# Patient Record
Sex: MALE | Race: WHITE | Employment: FULL TIME | URBAN - METROPOLITAN AREA
[De-identification: names, ages, dates, MRNs, and addresses within clinical notes are randomized per-mention and may not be internally consistent; named-entity substitution may affect disease eponyms.]

---

## 2024-09-06 ENCOUNTER — OFFICE VISIT (OUTPATIENT)
Dept: PRIMARY CARE CLINIC | Age: 29
End: 2024-09-06
Payer: COMMERCIAL

## 2024-09-06 VITALS
TEMPERATURE: 97.9 F | DIASTOLIC BLOOD PRESSURE: 70 MMHG | WEIGHT: 216.8 LBS | HEART RATE: 98 BPM | SYSTOLIC BLOOD PRESSURE: 110 MMHG | HEIGHT: 70 IN | OXYGEN SATURATION: 99 % | RESPIRATION RATE: 12 BRPM | BODY MASS INDEX: 31.04 KG/M2

## 2024-09-06 DIAGNOSIS — Z11.4 ENCOUNTER FOR SCREENING FOR HIV: ICD-10-CM

## 2024-09-06 DIAGNOSIS — Z00.00 HEALTHCARE MAINTENANCE: Primary | ICD-10-CM

## 2024-09-06 DIAGNOSIS — Z13.220 SCREENING CHOLESTEROL LEVEL: ICD-10-CM

## 2024-09-06 DIAGNOSIS — Z13.1 DIABETES MELLITUS SCREENING: ICD-10-CM

## 2024-09-06 DIAGNOSIS — Z23 IMMUNIZATION DUE: ICD-10-CM

## 2024-09-06 DIAGNOSIS — Z11.59 ENCOUNTER FOR HEPATITIS C SCREENING TEST FOR LOW RISK PATIENT: ICD-10-CM

## 2024-09-06 PROCEDURE — 90471 IMMUNIZATION ADMIN: CPT | Performed by: FAMILY MEDICINE

## 2024-09-06 PROCEDURE — 90651 9VHPV VACCINE 2/3 DOSE IM: CPT | Performed by: FAMILY MEDICINE

## 2024-09-06 PROCEDURE — 99385 PREV VISIT NEW AGE 18-39: CPT | Performed by: FAMILY MEDICINE

## 2024-09-06 SDOH — ECONOMIC STABILITY: FOOD INSECURITY: WITHIN THE PAST 12 MONTHS, THE FOOD YOU BOUGHT JUST DIDN'T LAST AND YOU DIDN'T HAVE MONEY TO GET MORE.: NEVER TRUE

## 2024-09-06 SDOH — ECONOMIC STABILITY: FOOD INSECURITY: WITHIN THE PAST 12 MONTHS, YOU WORRIED THAT YOUR FOOD WOULD RUN OUT BEFORE YOU GOT MONEY TO BUY MORE.: NEVER TRUE

## 2024-09-06 SDOH — ECONOMIC STABILITY: INCOME INSECURITY: HOW HARD IS IT FOR YOU TO PAY FOR THE VERY BASICS LIKE FOOD, HOUSING, MEDICAL CARE, AND HEATING?: NOT VERY HARD

## 2024-09-06 ASSESSMENT — ENCOUNTER SYMPTOMS
VOMITING: 0
COUGH: 0
NAUSEA: 0
RHINORRHEA: 0
DIARRHEA: 0
BLOOD IN STOOL: 0
SHORTNESS OF BREATH: 0

## 2024-09-06 ASSESSMENT — PATIENT HEALTH QUESTIONNAIRE - PHQ9
SUM OF ALL RESPONSES TO PHQ QUESTIONS 1-9: 0
1. LITTLE INTEREST OR PLEASURE IN DOING THINGS: NOT AT ALL
SUM OF ALL RESPONSES TO PHQ QUESTIONS 1-9: 0
SUM OF ALL RESPONSES TO PHQ9 QUESTIONS 1 & 2: 0
2. FEELING DOWN, DEPRESSED OR HOPELESS: NOT AT ALL

## 2024-09-06 NOTE — ASSESSMENT & PLAN NOTE
Overall doing okay. Age/risk appropriate screenings and immunizations provided as per orders below and as required by work. Reminded to schedule dental and eye exams when able. Counseled briefly and provided written materials on recommendations for exercise and healthy diet. Derm sheet provided to have rash evaluated - possibly fungal but also could be eczema or psoriasis. He was amenable to this.

## 2024-09-06 NOTE — PROGRESS NOTES
Travis Chowdhury is a 28 y.o. year old male here for:    Chief Complaint:    Chief Complaint   Patient presents with    Annual Exam     Subjective:    Today, his current concerns include:    Preventive Services:    Health Maintenance History:  Patient exercises regularly? Nothing regimented, stays active  Diet? Tries to eat a healthy diet, some soda, lots of water  Dental: Last visit was last year  Glasses/Eyes: Last visit was a long time ago    Other Health Maintenance History:  Sexually active? Yes with one female partner - monogamous  In the past two weeks have they been bothered by feeling \"down\", depressed or hopeless?  no  In the past two weeks, have they experienced a loss of interest or pleasure in doing things?  no  In the last year, have you fallen more than once or been seriously injured in a fall?  no  Advance Directives: Not in place. Provided instructions today on how to sign up/provide these on [employer].    Health Maintenance Screening:  Diabetes screening: was provided today  Cholesterol screening: was provided today  Prostate cancer screening order: was not applicable to this patient based on their risk factors and evidence based recommendations (no personal or family history of prostate CA) today.   AAA Screening: was not applicable to this patient based on their risk factors and evidence based recommendations today  Colorectal cancer screening (Screening colonoscopy) order: was not applicable to this patient based on their risk factors and evidence based recommendations (no personal or family history of colon CA) today  Lung Cancer Screen:was not applicable to this patient based on their risk factors and evidence based recommendations today  Hep C screening: was provided today  HIV screening: was provided today    Immunizations:   Pneumonia vaccine: was not applicable to this patient based on their risk factors and evidence based recommendations today  TDAP vaccine: was not provided today -  UTD on

## 2024-09-06 NOTE — PATIENT INSTRUCTIONS
For help and support with [employer] jayro/portal set-up, please call [phone].     Reminder: Please call to schedule dental and eye exams when able.    Zevia soda.    Lifestyle modifications discussed today:    Exercise:    In accordance with AHA/ACC guidelines:    - Get at least 150 minutes per week of moderate-intensity aerobic activity OR 75 minutes per week of vigorous aerobic activity, OR a combination of both, preferably spread throughout the week.    - Add moderate- to high-intensity muscle-strengthening activity (such as resistance or weights) on at least 2 days per week.    - Gain even more benefits by being active at least 300 minutes (5 hours) per week.    Increase amount and intensity gradually over time.    Diet:    Dietary pattern should consist of vegetables, fruits and whole grains, low-fat dairy (or no dairy if intolerant), suggested poultry and fish. Consider legumes, vegetable oils and nuts and limiting intake of sweets, sugar, sweetened beverages and red meat.    Aim is to have 5% of calories from saturated fats or less and no trans fat.    Can consider sodium reduction IF elevated blood pressure/hypertension to no more than 2.4g daily to start, with eventual goal to 1.5g if so.     Please find our [facility] Location Guide below for your convenience!    CENTRAL LOCATIONS    1) [facility]  [address]  [place], [place] [address]  Phone: [phone]    2) [facility]  [address], [place] [address]  Phone: [phone]    [facility] LOCATIONS    3) [facility]  [address]  [place], [place] [address]  Phone: [phone]    4) [facility]  [address] [place] [address]  Phone: [phone]    5) [facility]  [address] [place] [address]  Phone: [phone]    6) [facility]  [address]  [place] [address]   Phone: [phone]    [place] LOCATIONS    7)

## 2024-09-13 DIAGNOSIS — Z11.59 ENCOUNTER FOR HEPATITIS C SCREENING TEST FOR LOW RISK PATIENT: ICD-10-CM

## 2024-09-13 DIAGNOSIS — Z13.220 SCREENING CHOLESTEROL LEVEL: ICD-10-CM

## 2024-09-13 DIAGNOSIS — Z11.4 ENCOUNTER FOR SCREENING FOR HIV: ICD-10-CM

## 2024-09-13 DIAGNOSIS — Z13.1 DIABETES MELLITUS SCREENING: ICD-10-CM

## 2024-09-13 LAB
ALBUMIN SERPL-MCNC: 4.5 G/DL (ref 3.4–5)
ALBUMIN/GLOB SERPL: 1.8 {RATIO} (ref 1.1–2.2)
ALP SERPL-CCNC: 66 U/L (ref 40–129)
ALT SERPL-CCNC: 46 U/L (ref 10–40)
ANION GAP SERPL CALCULATED.3IONS-SCNC: 12 MMOL/L (ref 3–16)
AST SERPL-CCNC: 30 U/L (ref 15–37)
BILIRUB SERPL-MCNC: 0.6 MG/DL (ref 0–1)
BUN SERPL-MCNC: 16 MG/DL (ref 7–20)
CALCIUM SERPL-MCNC: 9.8 MG/DL (ref 8.3–10.6)
CHLORIDE SERPL-SCNC: 103 MMOL/L (ref 99–110)
CHOLEST SERPL-MCNC: 205 MG/DL (ref 0–199)
CO2 SERPL-SCNC: 25 MMOL/L (ref 21–32)
CREAT SERPL-MCNC: 1 MG/DL (ref 0.9–1.3)
GFR SERPLBLD CREATININE-BSD FMLA CKD-EPI: >90 ML/MIN/{1.73_M2}
GLUCOSE SERPL-MCNC: 77 MG/DL (ref 70–99)
HCV AB SERPL QL IA: NORMAL
HDLC SERPL-MCNC: 27 MG/DL (ref 40–60)
LDLC SERPL CALC-MCNC: 144 MG/DL
POTASSIUM SERPL-SCNC: 4.1 MMOL/L (ref 3.5–5.1)
PROT SERPL-MCNC: 7 G/DL (ref 6.4–8.2)
SODIUM SERPL-SCNC: 140 MMOL/L (ref 136–145)
TRIGL SERPL-MCNC: 169 MG/DL (ref 0–150)
VLDLC SERPL CALC-MCNC: 34 MG/DL

## 2024-09-14 LAB
EST. AVERAGE GLUCOSE BLD GHB EST-MCNC: 93.9 MG/DL
HBA1C MFR BLD: 4.9 %
HIV 1+2 AB+HIV1 P24 AG SERPL QL IA: NORMAL
HIV 2 AB SERPL QL IA: NORMAL
HIV1 AB SERPL QL IA: NORMAL
HIV1 P24 AG SERPL QL IA: NORMAL

## 2024-10-06 PROBLEM — Z13.1 DIABETES MELLITUS SCREENING: Status: RESOLVED | Noted: 2024-09-06 | Resolved: 2024-10-06

## 2024-10-06 PROBLEM — Z13.220 SCREENING CHOLESTEROL LEVEL: Status: RESOLVED | Noted: 2024-09-06 | Resolved: 2024-10-06

## 2024-10-06 PROBLEM — Z11.59 ENCOUNTER FOR HEPATITIS C SCREENING TEST FOR LOW RISK PATIENT: Status: RESOLVED | Noted: 2024-09-06 | Resolved: 2024-10-06

## 2024-10-06 PROBLEM — Z11.4 ENCOUNTER FOR SCREENING FOR HIV: Status: RESOLVED | Noted: 2024-09-06 | Resolved: 2024-10-06

## 2024-10-06 PROBLEM — Z00.00 HEALTHCARE MAINTENANCE: Status: RESOLVED | Noted: 2024-09-06 | Resolved: 2024-10-06

## 2024-10-12 ENCOUNTER — APPOINTMENT (OUTPATIENT)
Dept: GENERAL RADIOLOGY | Age: 29
End: 2024-10-12
Payer: OTHER MISCELLANEOUS

## 2024-10-12 ENCOUNTER — HOSPITAL ENCOUNTER (EMERGENCY)
Age: 29
Discharge: HOME | End: 2024-10-12
Attending: EMERGENCY MEDICINE
Payer: OTHER MISCELLANEOUS

## 2024-10-12 VITALS
BODY MASS INDEX: 36.11 KG/M2 | HEART RATE: 89 BPM | HEIGHT: 70 IN | DIASTOLIC BLOOD PRESSURE: 80 MMHG | SYSTOLIC BLOOD PRESSURE: 133 MMHG | DIASTOLIC BLOOD PRESSURE: 80 MMHG | TEMPERATURE: 98.7 F | OXYGEN SATURATION: 97 % | OXYGEN SATURATION: 97 % | SYSTOLIC BLOOD PRESSURE: 133 MMHG | WEIGHT: 252.2 LBS | HEIGHT: 70 IN | RESPIRATION RATE: 20 BRPM | BODY MASS INDEX: 36.11 KG/M2 | WEIGHT: 252.2 LBS | HEART RATE: 89 BPM | TEMPERATURE: 98.7 F | RESPIRATION RATE: 20 BRPM

## 2024-10-12 DIAGNOSIS — S62.91XA CLOSED FRACTURE OF RIGHT HAND, INITIAL ENCOUNTER: Primary | ICD-10-CM

## 2024-10-12 PROCEDURE — 29125 APPL SHORT ARM SPLINT STATIC: CPT

## 2024-10-12 PROCEDURE — 73090 X-RAY EXAM OF FOREARM: CPT

## 2024-10-12 PROCEDURE — 73080 X-RAY EXAM OF ELBOW: CPT

## 2024-10-12 PROCEDURE — 73110 X-RAY EXAM OF WRIST: CPT

## 2024-10-12 PROCEDURE — 99283 EMERGENCY DEPT VISIT LOW MDM: CPT

## 2024-10-12 ASSESSMENT — PAIN DESCRIPTION - LOCATION
LOCATION: WRIST
LOCATION: WRIST

## 2024-10-12 ASSESSMENT — PAIN DESCRIPTION - ORIENTATION
ORIENTATION: RIGHT
ORIENTATION: RIGHT

## 2024-10-12 ASSESSMENT — PAIN DESCRIPTION - PAIN TYPE
TYPE: ACUTE PAIN
TYPE: ACUTE PAIN

## 2024-10-12 ASSESSMENT — LIFESTYLE VARIABLES
HOW OFTEN DO YOU HAVE A DRINK CONTAINING ALCOHOL: NEVER
HOW MANY STANDARD DRINKS CONTAINING ALCOHOL DO YOU HAVE ON A TYPICAL DAY: PATIENT DOES NOT DRINK

## 2024-10-12 ASSESSMENT — PAIN - FUNCTIONAL ASSESSMENT
PAIN_FUNCTIONAL_ASSESSMENT: PREVENTS OR INTERFERES SOME ACTIVE ACTIVITIES AND ADLS
PAIN_FUNCTIONAL_ASSESSMENT: 0-10
PAIN_FUNCTIONAL_ASSESSMENT: PREVENTS OR INTERFERES SOME ACTIVE ACTIVITIES AND ADLS

## 2024-10-12 ASSESSMENT — PAIN DESCRIPTION - DESCRIPTORS
DESCRIPTORS: ACHING;SHOOTING
DESCRIPTORS: ACHING;SHOOTING

## 2024-10-12 ASSESSMENT — PAIN DESCRIPTION - ONSET
ONSET: ON-GOING
ONSET: SUDDEN

## 2024-10-12 ASSESSMENT — PAIN DESCRIPTION - FREQUENCY
FREQUENCY: CONTINUOUS
FREQUENCY: CONTINUOUS

## 2024-10-12 ASSESSMENT — PAIN SCALES - GENERAL
PAINLEVEL_OUTOF10: 7
PAINLEVEL_OUTOF10: 6
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
## 2024-10-14 ENCOUNTER — TELEPHONE (OUTPATIENT)
Dept: PRIMARY CARE CLINIC | Age: 29
End: 2024-10-14

## 2024-10-14 ENCOUNTER — TELEPHONE (OUTPATIENT)
Dept: ORTHOPEDIC SURGERY | Age: 29
End: 2024-10-14

## 2024-10-14 NOTE — TELEPHONE ENCOUNTER
Pt's spouse called to let us know what happened and that she is concerned that his wrist is infected for pus is coming out of the arm.  Advised pt that Dr Ashford was already out of the office for the evening but a message can be sent back.  Pt's spouse sts that pt does have an appointment with an orthopedic hand surgeon on 10/15/24.  Spouse sts she will take him back to the ER today to check on the arm since Dr Ashford is already out of the office.          ----- Message from Dr. Vandana Ashford MD sent at 10/14/2024  3:06 PM EDT -----  Call to check in and offer f/u visit if desired!  ----- Message -----  From: Deja Stallworth MD  Sent: 10/12/2024   6:53 PM EDT  To: Vandana Ashford MD

## 2024-10-15 ENCOUNTER — OFFICE VISIT (OUTPATIENT)
Dept: ORTHOPEDIC SURGERY | Age: 29
End: 2024-10-15

## 2024-10-15 DIAGNOSIS — L08.9: Primary | ICD-10-CM

## 2024-10-15 DIAGNOSIS — S60.811A: Primary | ICD-10-CM

## 2024-10-15 DIAGNOSIS — S62.314A CLOSED DISPLACED FRACTURE OF BASE OF FOURTH METACARPAL BONE OF RIGHT HAND, INITIAL ENCOUNTER: ICD-10-CM

## 2024-10-15 NOTE — TELEPHONE ENCOUNTER
patient spouse return call.    pt went to Orthopedic Dr julia Salgado. He was treated with antibiotics. Wife states they will call and make appointment if need be.

## 2024-10-17 ENCOUNTER — TELEPHONE (OUTPATIENT)
Dept: ORTHOPEDIC SURGERY | Age: 29
End: 2024-10-17

## 2024-10-17 NOTE — TELEPHONE ENCOUNTER
General Question     Subject: FMLA  Patient and /or Facility Request: Travis Chowdhury   Contact Number: [phone]      PATIENT WIFE DELL CALLED REQ TO SPEAK WITH SOMEONE    SHE IS WANTING TO KNOW IF SHE CAN DROP THE PATIENT SHORT TERM DISABILITY FORM OFF AT THE OFFICE THAT IS NEEDING FILLED OUT OR DOES THE PATIENT HAVE TO SCHEDULE AN APPT    PLEASE CALL BACK THE ABOVE NUMBER TO FURTHER ASSIST

## 2024-10-17 NOTE — TELEPHONE ENCOUNTER
SW patient's wife. Informed her that the paperwork could be dropped off at the  of any ortho office or could be attached to a [employer] message.

## 2024-10-18 ENCOUNTER — TELEPHONE (OUTPATIENT)
Dept: ORTHOPEDIC SURGERY | Age: 29
End: 2024-10-18

## 2024-10-18 ENCOUNTER — LAB (OUTPATIENT)
Dept: PRIMARY CARE CLINIC | Age: 29
End: 2024-10-18
Payer: COMMERCIAL

## 2024-10-18 DIAGNOSIS — Z23 IMMUNIZATION DUE: Primary | ICD-10-CM

## 2024-10-18 PROCEDURE — 90471 IMMUNIZATION ADMIN: CPT | Performed by: FAMILY MEDICINE

## 2024-10-18 PROCEDURE — 90651 9VHPV VACCINE 2/3 DOSE IM: CPT | Performed by: FAMILY MEDICINE

## 2024-10-18 NOTE — PROGRESS NOTES
Travis Chowdhury is here for immunization(s) as noted in orders as signed by Dr. Ashford. Consent obtained by patient and VIS provided. Patient tolerated the immunization(s) well with no issues and all questions answered.

## 2024-10-19 PROBLEM — S62.314A CLOSED DISPLACED FRACTURE OF BASE OF FOURTH METACARPAL BONE OF RIGHT HAND: Status: ACTIVE | Noted: 2024-10-19

## 2024-10-19 PROBLEM — L08.9: Status: ACTIVE | Noted: 2024-10-19

## 2024-10-19 PROBLEM — S60.811A: Status: ACTIVE | Noted: 2024-10-19

## 2024-10-19 RX ORDER — CEPHALEXIN 500 MG/1
500 CAPSULE ORAL 4 TIMES DAILY
Qty: 40 CAPSULE | Refills: 0 | Status: SHIPPED | OUTPATIENT
Start: 2024-10-19 | End: 2024-10-29

## 2024-10-19 NOTE — PROGRESS NOTES
and we can try to treat this non-operatively in an ulnar gutter brace.  We discussed the risk of infection, nonunion and or malunion.  We applied a brace today in the office and instructed him in care.  Keflex Rx sent. We will see him back in 6 weeks at which time we will get a new xray of the right hand.           Procedures    Amaya Watkins TKO     Patient was prescribed a Amaya Watkins TKO.   The right hand will require stabilization / immobilization from this semi-rigid / rigid orthosis to improve their function.  The orthosis will assist in protecting the affected area, provide functional support and facilitate healing.    The patient was educated and fit by a healthcare professional with expert knowledge and specialization in brace application while under the direct supervision of the physician.  Verbal and written instructions for the use of and application of this item were provided.   They were instructed to contact the office immediately should the brace result in increased pain, decreased sensation, increased swelling or worsening of the condition.         Luís Salgado MD

## 2024-10-21 ENCOUNTER — TELEPHONE (OUTPATIENT)
Dept: ORTHOPEDIC SURGERY | Age: 29
End: 2024-10-21

## 2024-10-21 NOTE — TELEPHONE ENCOUNTER
Faxed completed aps for [facility] to [phone]    Faxed completed fmla to [facility] @ [phone]

## 2024-11-19 ENCOUNTER — OFFICE VISIT (OUTPATIENT)
Dept: ORTHOPEDIC SURGERY | Age: 29
End: 2024-11-19
Payer: COMMERCIAL

## 2024-11-19 DIAGNOSIS — L08.9: ICD-10-CM

## 2024-11-19 DIAGNOSIS — S62.314A CLOSED DISPLACED FRACTURE OF BASE OF FOURTH METACARPAL BONE OF RIGHT HAND, INITIAL ENCOUNTER: Primary | ICD-10-CM

## 2024-11-19 DIAGNOSIS — S60.811A: ICD-10-CM

## 2024-11-19 PROCEDURE — 99213 OFFICE O/P EST LOW 20 MIN: CPT | Performed by: ORTHOPAEDIC SURGERY
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
## 2025-01-21 ENCOUNTER — OFFICE VISIT (OUTPATIENT)
Dept: ORTHOPEDIC SURGERY | Age: 30
End: 2025-01-21

## 2025-01-21 VITALS — WEIGHT: 252 LBS | BODY MASS INDEX: 36.08 KG/M2 | HEIGHT: 70 IN

## 2025-01-21 DIAGNOSIS — S62.314A CLOSED DISPLACED FRACTURE OF BASE OF FOURTH METACARPAL BONE OF RIGHT HAND, INITIAL ENCOUNTER: Primary | ICD-10-CM
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
